# Patient Record
Sex: MALE | Race: OTHER | HISPANIC OR LATINO | URBAN - METROPOLITAN AREA
[De-identification: names, ages, dates, MRNs, and addresses within clinical notes are randomized per-mention and may not be internally consistent; named-entity substitution may affect disease eponyms.]

---

## 2018-07-30 ENCOUNTER — EMERGENCY (EMERGENCY)
Facility: HOSPITAL | Age: 58
LOS: 1 days | Discharge: HOME | End: 2018-07-30
Attending: EMERGENCY MEDICINE
Payer: SELF-PAY

## 2018-07-30 VITALS
SYSTOLIC BLOOD PRESSURE: 132 MMHG | OXYGEN SATURATION: 99 % | HEART RATE: 67 BPM | RESPIRATION RATE: 18 BRPM | TEMPERATURE: 99 F | DIASTOLIC BLOOD PRESSURE: 76 MMHG

## 2018-07-30 VITALS
HEART RATE: 72 BPM | TEMPERATURE: 99 F | SYSTOLIC BLOOD PRESSURE: 146 MMHG | RESPIRATION RATE: 18 BRPM | DIASTOLIC BLOOD PRESSURE: 89 MMHG | HEIGHT: 69 IN | OXYGEN SATURATION: 98 % | WEIGHT: 184.97 LBS

## 2018-07-30 PROCEDURE — 99284 EMERGENCY DEPT VISIT MOD MDM: CPT

## 2018-07-30 PROCEDURE — 99285 EMERGENCY DEPT VISIT HI MDM: CPT

## 2018-07-30 NOTE — ED PROVIDER NOTE - OBJECTIVE STATEMENT
42 y/o M pt BIBA presents to ED for EtOH intoxication after being found sleeping on the floor of a laundromat. Patient has no complaints. denies fever. denies HA or neck pain. no chest pain or sob. no abd pain. no n/v/d. no urinary f/u/d. no back pain. no motor or sensory deficits. denies illicit drug use. no recent travel. no rash. no other acute issues symptoms or concerns.

## 2018-07-30 NOTE — ED ADULT NURSE NOTE - OBJECTIVE STATEMENT
Pt. BIBA after being found outside of laundromat, A&ox4, respirations even and unlabored, offers no complaints at this time.

## 2018-07-30 NOTE — ED PROVIDER NOTE - MEDICAL DECISION MAKING DETAILS
clincally osber anmbiulatin gno signs trauma sking for dispo home return to ed for intractable HA, persistent vomiting, or new onset motor/sensory deficits

## 2018-07-30 NOTE — ED ADULT NURSE NOTE - CHPI ED SYMPTOMS NEG
no abdominal distension/no confusion/no chills/no nausea/no pain/no fever/no disorientation/no abdominal pain/no vomiting

## 2019-04-04 NOTE — ED ADULT NURSE NOTE - BREATH SOUNDS, MLM
Patient comes to clinic for follow up anticoagulation visit.   Last INR 2.1 was 3/7. Dose maintained per protocol.   Today's INR is 1.8 and is below goal range.    Current warfarin dosing verified with patient. Patient was informed that today's INR result is below therapeutic range and instructed to maintain current dose per protocol,as previous INR's were therapeutic. Discussed return date for next INR.     Dr. Winifred Wagner is in the office today supervising the treatment.    Call your physician immediately if you notice any of the following symptoms of a blood clot:   · Sudden weakness in any limb  · Numbness or tingling anywhere  · Visual changes or loss of sight in either eye  · Sudden onset of slurred speech or inability to speak  · Dizziness or faintness  · New pain, swelling, redness or heat in any extremity  · New SOB or chest pain  Symptoms associated with blood clotting/low INR reviewed and verbalizes understanding.    Patient was instructed to contact the clinic with any unusual bleeding or bruising, any changes in medications, diet, health status, lifestyle, or any other changes, questions or concerns. Patient verbalized understanding of all discussed.       
Clear

## 2023-04-05 NOTE — ED ADULT NURSE NOTE - ALCOHOL PRE SCREEN (AUDIT - C)
Plan of care reviewed with patient and family. Time allowed for questions and concerns to be addressed. Pt tolerating advanced diet, pain well controlled.    Statement Selected Propranolol Pregnancy And Lactation Text: This medication is Pregnancy Category C and it isn't known if it is safe during pregnancy. It is excreted in breast milk.